# Patient Record
Sex: MALE | Race: BLACK OR AFRICAN AMERICAN | NOT HISPANIC OR LATINO | Employment: UNEMPLOYED | URBAN - METROPOLITAN AREA
[De-identification: names, ages, dates, MRNs, and addresses within clinical notes are randomized per-mention and may not be internally consistent; named-entity substitution may affect disease eponyms.]

---

## 2024-01-26 ENCOUNTER — APPOINTMENT (OUTPATIENT)
Dept: RADIOLOGY | Facility: HOSPITAL | Age: 44
End: 2024-01-26

## 2024-01-26 ENCOUNTER — HOSPITAL ENCOUNTER (EMERGENCY)
Facility: HOSPITAL | Age: 44
Discharge: HOME | End: 2024-01-26

## 2024-01-26 VITALS
RESPIRATION RATE: 18 BRPM | SYSTOLIC BLOOD PRESSURE: 176 MMHG | BODY MASS INDEX: 30.81 KG/M2 | WEIGHT: 253 LBS | OXYGEN SATURATION: 99 % | HEIGHT: 76 IN | HEART RATE: 70 BPM | DIASTOLIC BLOOD PRESSURE: 101 MMHG | TEMPERATURE: 97.2 F

## 2024-01-26 DIAGNOSIS — M54.31 SCIATICA OF RIGHT SIDE: Primary | ICD-10-CM

## 2024-01-26 PROCEDURE — 72131 CT LUMBAR SPINE W/O DYE: CPT

## 2024-01-26 PROCEDURE — 2500000005 HC RX 250 GENERAL PHARMACY W/O HCPCS: Performed by: PHYSICIAN ASSISTANT

## 2024-01-26 PROCEDURE — 99284 EMERGENCY DEPT VISIT MOD MDM: CPT | Mod: 25

## 2024-01-26 PROCEDURE — 72131 CT LUMBAR SPINE W/O DYE: CPT | Performed by: RADIOLOGY

## 2024-01-26 PROCEDURE — 99284 EMERGENCY DEPT VISIT MOD MDM: CPT | Performed by: PHYSICIAN ASSISTANT

## 2024-01-26 PROCEDURE — 2500000001 HC RX 250 WO HCPCS SELF ADMINISTERED DRUGS (ALT 637 FOR MEDICARE OP): Performed by: PHYSICIAN ASSISTANT

## 2024-01-26 RX ORDER — LIDOCAINE 560 MG/1
1 PATCH PERCUTANEOUS; TOPICAL; TRANSDERMAL DAILY
Status: DISCONTINUED | OUTPATIENT
Start: 2024-01-26 | End: 2024-01-26 | Stop reason: HOSPADM

## 2024-01-26 RX ORDER — LIDOCAINE 50 MG/G
1 PATCH TOPICAL DAILY
Qty: 5 PATCH | Refills: 0 | Status: SHIPPED | OUTPATIENT
Start: 2024-01-26

## 2024-01-26 RX ORDER — CYCLOBENZAPRINE HCL 10 MG
10 TABLET ORAL 3 TIMES DAILY PRN
Qty: 21 TABLET | Refills: 0 | Status: SHIPPED | OUTPATIENT
Start: 2024-01-26 | End: 2024-02-02

## 2024-01-26 RX ORDER — ACETAMINOPHEN 325 MG/1
975 TABLET ORAL ONCE
Status: COMPLETED | OUTPATIENT
Start: 2024-01-26 | End: 2024-01-26

## 2024-01-26 RX ORDER — ACETAMINOPHEN 500 MG
1000 TABLET ORAL EVERY 8 HOURS PRN
Qty: 30 TABLET | Refills: 0 | Status: SHIPPED | OUTPATIENT
Start: 2024-01-26 | End: 2024-02-05

## 2024-01-26 RX ORDER — CYCLOBENZAPRINE HCL 10 MG
10 TABLET ORAL ONCE
Status: COMPLETED | OUTPATIENT
Start: 2024-01-26 | End: 2024-01-26

## 2024-01-26 RX ADMIN — ACETAMINOPHEN 975 MG: 325 TABLET ORAL at 10:13

## 2024-01-26 RX ADMIN — LIDOCAINE 1 PATCH: 4 PATCH TOPICAL at 10:13

## 2024-01-26 RX ADMIN — CYCLOBENZAPRINE 10 MG: 10 TABLET, FILM COATED ORAL at 10:12

## 2024-01-26 ASSESSMENT — COLUMBIA-SUICIDE SEVERITY RATING SCALE - C-SSRS
2. HAVE YOU ACTUALLY HAD ANY THOUGHTS OF KILLING YOURSELF?: NO
1. IN THE PAST MONTH, HAVE YOU WISHED YOU WERE DEAD OR WISHED YOU COULD GO TO SLEEP AND NOT WAKE UP?: NO
6. HAVE YOU EVER DONE ANYTHING, STARTED TO DO ANYTHING, OR PREPARED TO DO ANYTHING TO END YOUR LIFE?: NO

## 2024-01-26 ASSESSMENT — PAIN SCALES - GENERAL: PAINLEVEL_OUTOF10: 7

## 2024-01-26 ASSESSMENT — PAIN - FUNCTIONAL ASSESSMENT: PAIN_FUNCTIONAL_ASSESSMENT: 0-10

## 2024-01-26 ASSESSMENT — PAIN DESCRIPTION - LOCATION: LOCATION: HIP

## 2024-01-26 NOTE — Clinical Note
Jed Gonsalez was seen and treated in our emergency department on 1/26/2024.  He may return to work on 01/29/2024.       If you have any questions or concerns, please don't hesitate to call.      Cristal Do PA-C

## 2024-01-26 NOTE — DISCHARGE INSTRUCTIONS
CT L spine shows some mild disc bulging without herniation, spinal fractures.  Take pain medications as needed.  Please call [phone] for Primary Care referral for follow up appointments.  Please call [phone] for Orthopedic / Spine referral for follow up appointments if your pain persists beyond 1 week.

## 2024-01-26 NOTE — ED TRIAGE NOTES
Pt to ED c/o R hip pain radiating down R leg to foot. Pt noting numbness with pain. Denies injury. Pain building for last 3-4 days, worsening today with ambulation

## 2024-01-26 NOTE — ED PROVIDER NOTES
Emergency Department Encounter  [facility] EMERGENCY MEDICINE    Patient: Jed Gonsalez  MRN: [id number]  : 1980  Date of Evaluation: 2024  ED Provider: Cristal Do PA-C      Chief Complaint       Chief Complaint   Patient presents with    Hip Pain     HPI    Jed Gonsalez is a 43 y.o. male who presents to the emergency department presenting for right hip pain for the past 3 days.  Patient states that his pain is constant, fluctuates in intensity.  At rest his pain radiates from his right lateral hip down to his right lateral knee.  With ambulation the pain shoots down to his foot with associated numbness and tingling.  Denies any fevers, chills, bowel or bladder incontinence or retention, saddle anesthesia, inability to ambulate.  Still ambulating independently without need for assistive devices, per his usual baseline.  Not taking any medications at home for his complaints.  Endorses that he had a fall straight onto his back several weeks ago, no other acute injuries.  Endorses history of hives with NSAID ingestion. No focal back/neck pain.    ROS:     Review of Systems  14 systems reviewed and otherwise acutely negative except as in the HPI.    Past History   No past medical history on file.  No past surgical history on file.  Social History     Socioeconomic History    Marital status: Single     Spouse name: Not on file    Number of children: Not on file    Years of education: Not on file    Highest education level: Not on file   Occupational History    Not on file   Tobacco Use    Smoking status: Not on file    Smokeless tobacco: Not on file   Substance and Sexual Activity    Alcohol use: Not on file    Drug use: Not on file    Sexual activity: Not on file   Other Topics Concern    Not on file   Social History Narrative    Not on file     Social Determinants of Health     Financial Resource Strain: Not on file   Food Insecurity: Not on file   Transportation Needs: Not on file    Physical Activity: Not on file   Stress: Not on file   Social Connections: Not on file   Intimate Partner Violence: Not on file   Housing Stability: Not on file       Medications/Allergies     Discharge Medication List as of 1/26/2024 11:09 AM        Allergies   Allergen Reactions    Nsaids (Non-Steroidal Anti-Inflammatory Drug) Hives        Physical Exam       ED Triage Vitals [01/26/24 0909]   Temperature Heart Rate Respirations BP   36.2 °C (97.2 °F) 70 18 (!) 176/101      Pulse Ox Temp Source Heart Rate Source Patient Position   99 % Temporal -- --      BP Location FiO2 (%)     -- --         Physical Exam    Physical Exam:     VS: As documented in the triage note and EMR flowsheet from this visit were reviewed.    Appearance: Alert, oriented, cooperative, in no acute distress. Well nourished & well hydrated.    Skin: Atraumatic. Warm, intact and dry.    Neck: Supple, without midline or paraspinal tenderness    Pulmonary: Clear bilaterally with good chest wall excursion. No rales, rhonchi or wheezing. No accessory muscle use or stridor.     Cardiac: Normal S1, S2.    Abdomen: Soft, nontender, active bowel sounds. No rebound or guarding. No CVA tenderness.    Musculoskeletal: Spontaneously moving all extremities without limitation. No midline tenderness. +R paraspinal TTP with TTP at R sciatic notch, +ipsilateral SLR. Extremities warm and well-perfused, capillary refill less than 2 seconds. Pulses full and equal. No lower extremity erythema, edema or increased warmth.    Neurological: Normal sensation, no weakness, no focal findings identified. Ambulating without assistance with steady gait, non-ataxic.      Diagnostics   Radiographs:  CT lumbar spine wo IV contrast   Final Result   No evidence of an acute fracture of the lumbar spine.        Degenerative changes with mild spinal canal and neural foraminal   stenosis.        MACRO:   None        Signed by: Olya Anguiano 1/26/2024 11:00 AM   Dictation  "workstation:   [id number]          ED Course   Visit Vitals  BP (!) 176/101   Pulse 70   Temp 36.2 °C (97.2 °F) (Temporal)   Resp 18   Ht 1.93 m (6' 4\")   Wt 115 kg (253 lb)   SpO2 99%   BMI 30.80 kg/m²   BSA 2.48 m²     Medications   lidocaine 4 % patch 1 patch (1 patch transdermal Medication Applied 1/26/24 1013)   acetaminophen (Tylenol) tablet 975 mg (975 mg oral Given 1/26/24 1013)   cyclobenzaprine (Flexeril) tablet 10 mg (10 mg oral Given 1/26/24 1012)       Medical Decision Making     Diagnoses as of 01/26/24 1128   Sciatica of right side     CT of the L-spine is negative for acute traumatic injuries.  Does have some disc bulging that the patient was informed of.  Advise follow-up with PCP, orthospine if pain persist.  Given prescriptions for Tylenol, Flexeril and lidocaine patches as needed.  Rest and ice your injuries, avoid heavy lifting until you are seen by PCP.      Final Impression      1. Sciatica of right side          DISPOSITION  Disposition: discharge  Patient condition is: Stable    Comment: Please note this report has been produced using speech recognition software and may contain errors related to that system including errors in grammar, punctuation, and spelling, as well as words and phrases that may be inappropriate.  If there are any questions or concerns please feel free to contact the dictating provider for clarification.    ANG Castellon PA-C  01/26/24 1128    "

## 2025-05-26 ENCOUNTER — HOSPITAL ENCOUNTER (OUTPATIENT)
Facility: HOSPITAL | Age: 45
Setting detail: OBSERVATION
Discharge: HOME | End: 2025-05-27
Attending: EMERGENCY MEDICINE | Admitting: INTERNAL MEDICINE
Payer: COMMERCIAL

## 2025-05-26 ENCOUNTER — APPOINTMENT (OUTPATIENT)
Dept: RADIOLOGY | Facility: HOSPITAL | Age: 45
End: 2025-05-26
Payer: COMMERCIAL

## 2025-05-26 ENCOUNTER — APPOINTMENT (OUTPATIENT)
Dept: CARDIOLOGY | Facility: HOSPITAL | Age: 45
End: 2025-05-26
Payer: COMMERCIAL
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
## 2025-05-26 DIAGNOSIS — R79.89 TROPONIN LEVEL ELEVATED: ICD-10-CM

## 2025-05-26 DIAGNOSIS — T75.4XXA INJURY FROM ELECTROSHOCK GUN, INITIAL ENCOUNTER: Primary | ICD-10-CM

## 2025-05-26 DIAGNOSIS — R79.89 ELEVATED TROPONIN: ICD-10-CM

## 2025-05-26 DIAGNOSIS — I51.7 LVH (LEFT VENTRICULAR HYPERTROPHY): ICD-10-CM

## 2025-05-26 LAB
ALBUMIN SERPL BCP-MCNC: 4.6 G/DL (ref 3.4–5)
ALP SERPL-CCNC: 62 U/L (ref 33–120)
ALT SERPL W P-5'-P-CCNC: 26 U/L (ref 10–52)
ANION GAP SERPL CALC-SCNC: 15 MMOL/L (ref 10–20)
AST SERPL W P-5'-P-CCNC: 30 U/L (ref 9–39)
ATRIAL RATE: 82 BPM
ATRIAL RATE: 82 BPM
ATRIAL RATE: 86 BPM
BASOPHILS # BLD AUTO: 0.02 X10*3/UL (ref 0–0.1)
BASOPHILS NFR BLD AUTO: 0.3 %
BILIRUB SERPL-MCNC: 0.7 MG/DL (ref 0–1.2)
BNP SERPL-MCNC: 6 PG/ML (ref 0–99)
BUN SERPL-MCNC: 18 MG/DL (ref 6–23)
CALCIUM SERPL-MCNC: 9.8 MG/DL (ref 8.6–10.6)
CARDIAC TROPONIN I PNL SERPL HS: 144 NG/L (ref 0–53)
CARDIAC TROPONIN I PNL SERPL HS: 150 NG/L (ref 0–53)
CHLORIDE SERPL-SCNC: 103 MMOL/L (ref 98–107)
CHOLEST SERPL-MCNC: 220 MG/DL (ref 0–199)
CHOLESTEROL/HDL RATIO: 5.5
CO2 SERPL-SCNC: 24 MMOL/L (ref 21–32)
CREAT SERPL-MCNC: 1.46 MG/DL (ref 0.5–1.3)
EGFRCR SERPLBLD CKD-EPI 2021: 61 ML/MIN/1.73M*2
EOSINOPHIL # BLD AUTO: 0.05 X10*3/UL (ref 0–0.7)
EOSINOPHIL NFR BLD AUTO: 0.7 %
ERYTHROCYTE [DISTWIDTH] IN BLOOD BY AUTOMATED COUNT: 12.3 % (ref 11.5–14.5)
EST. AVERAGE GLUCOSE BLD GHB EST-MCNC: 111 MG/DL
GLUCOSE SERPL-MCNC: 107 MG/DL (ref 74–99)
HBA1C MFR BLD: 5.5 % (ref ?–5.7)
HCT VFR BLD AUTO: 40.6 % (ref 41–52)
HDLC SERPL-MCNC: 39.8 MG/DL
HGB BLD-MCNC: 14.3 G/DL (ref 13.5–17.5)
IMM GRANULOCYTES # BLD AUTO: 0.03 X10*3/UL (ref 0–0.7)
IMM GRANULOCYTES NFR BLD AUTO: 0.4 % (ref 0–0.9)
LDLC SERPL CALC-MCNC: 150 MG/DL
LYMPHOCYTES # BLD AUTO: 1.51 X10*3/UL (ref 1.2–4.8)
LYMPHOCYTES NFR BLD AUTO: 21.6 %
MAGNESIUM SERPL-MCNC: 2.27 MG/DL (ref 1.6–2.4)
MCH RBC QN AUTO: 29.9 PG (ref 26–34)
MCHC RBC AUTO-ENTMCNC: 35.2 G/DL (ref 32–36)
MCV RBC AUTO: 85 FL (ref 80–100)
MONOCYTES # BLD AUTO: 0.39 X10*3/UL (ref 0.1–1)
MONOCYTES NFR BLD AUTO: 5.6 %
NEUTROPHILS # BLD AUTO: 4.98 X10*3/UL (ref 1.2–7.7)
NEUTROPHILS NFR BLD AUTO: 71.4 %
NON HDL CHOLESTEROL: 180 MG/DL (ref 0–149)
NRBC BLD-RTO: 0 /100 WBCS (ref 0–0)
P AXIS: 26 DEGREES
P AXIS: 45 DEGREES
P AXIS: 47 DEGREES
P OFFSET: 174 MS
P OFFSET: 178 MS
P OFFSET: 179 MS
P ONSET: 128 MS
P ONSET: 130 MS
P ONSET: 132 MS
PLATELET # BLD AUTO: 308 X10*3/UL (ref 150–450)
POTASSIUM SERPL-SCNC: 3.8 MMOL/L (ref 3.5–5.3)
PR INTERVAL: 172 MS
PR INTERVAL: 178 MS
PR INTERVAL: 178 MS
PROT SERPL-MCNC: 7.9 G/DL (ref 6.4–8.2)
Q ONSET: 217 MS
Q ONSET: 218 MS
Q ONSET: 219 MS
QRS COUNT: 13 BEATS
QRS COUNT: 13 BEATS
QRS COUNT: 14 BEATS
QRS DURATION: 116 MS
QRS DURATION: 118 MS
QRS DURATION: 118 MS
QT INTERVAL: 364 MS
QT INTERVAL: 366 MS
QT INTERVAL: 370 MS
QTC CALCULATION(BAZETT): 425 MS
QTC CALCULATION(BAZETT): 432 MS
QTC CALCULATION(BAZETT): 437 MS
QTC FREDERICIA: 404 MS
QTC FREDERICIA: 410 MS
QTC FREDERICIA: 412 MS
R AXIS: 39 DEGREES
R AXIS: 47 DEGREES
R AXIS: 60 DEGREES
RBC # BLD AUTO: 4.78 X10*6/UL (ref 4.5–5.9)
SODIUM SERPL-SCNC: 138 MMOL/L (ref 136–145)
T AXIS: -26 DEGREES
T AXIS: -31 DEGREES
T AXIS: -34 DEGREES
T OFFSET: 400 MS
T OFFSET: 400 MS
T OFFSET: 404 MS
TRIGL SERPL-MCNC: 149 MG/DL (ref 0–149)
VENTRICULAR RATE: 82 BPM
VENTRICULAR RATE: 82 BPM
VENTRICULAR RATE: 86 BPM
VLDL: 30 MG/DL (ref 0–40)
WBC # BLD AUTO: 7 X10*3/UL (ref 4.4–11.3)

## 2025-05-26 PROCEDURE — 93010 ELECTROCARDIOGRAM REPORT: CPT | Performed by: INTERNAL MEDICINE

## 2025-05-26 PROCEDURE — 85025 COMPLETE CBC W/AUTO DIFF WBC: CPT

## 2025-05-26 PROCEDURE — 2500000001 HC RX 250 WO HCPCS SELF ADMINISTERED DRUGS (ALT 637 FOR MEDICARE OP): Mod: SE

## 2025-05-26 PROCEDURE — 99222 1ST HOSP IP/OBS MODERATE 55: CPT

## 2025-05-26 PROCEDURE — 36415 COLL VENOUS BLD VENIPUNCTURE: CPT

## 2025-05-26 PROCEDURE — 93005 ELECTROCARDIOGRAM TRACING: CPT

## 2025-05-26 PROCEDURE — 84484 ASSAY OF TROPONIN QUANT: CPT

## 2025-05-26 PROCEDURE — 99285 EMERGENCY DEPT VISIT HI MDM: CPT | Mod: 25 | Performed by: EMERGENCY MEDICINE

## 2025-05-26 PROCEDURE — 93321 DOPPLER ECHO F-UP/LMTD STD: CPT

## 2025-05-26 PROCEDURE — 83880 ASSAY OF NATRIURETIC PEPTIDE: CPT | Performed by: STUDENT IN AN ORGANIZED HEALTH CARE EDUCATION/TRAINING PROGRAM

## 2025-05-26 PROCEDURE — 93308 TTE F-UP OR LMTD: CPT | Performed by: INTERNAL MEDICINE

## 2025-05-26 PROCEDURE — 93325 DOPPLER ECHO COLOR FLOW MAPG: CPT | Performed by: INTERNAL MEDICINE

## 2025-05-26 PROCEDURE — G0378 HOSPITAL OBSERVATION PER HR: HCPCS

## 2025-05-26 PROCEDURE — 80061 LIPID PANEL: CPT | Performed by: STUDENT IN AN ORGANIZED HEALTH CARE EDUCATION/TRAINING PROGRAM

## 2025-05-26 PROCEDURE — 2500000004 HC RX 250 GENERAL PHARMACY W/ HCPCS (ALT 636 FOR OP/ED): Mod: SE

## 2025-05-26 PROCEDURE — 96374 THER/PROPH/DIAG INJ IV PUSH: CPT | Mod: 59

## 2025-05-26 PROCEDURE — 83735 ASSAY OF MAGNESIUM: CPT

## 2025-05-26 PROCEDURE — 71046 X-RAY EXAM CHEST 2 VIEWS: CPT

## 2025-05-26 PROCEDURE — 93321 DOPPLER ECHO F-UP/LMTD STD: CPT | Performed by: INTERNAL MEDICINE

## 2025-05-26 PROCEDURE — 83036 HEMOGLOBIN GLYCOSYLATED A1C: CPT | Performed by: STUDENT IN AN ORGANIZED HEALTH CARE EDUCATION/TRAINING PROGRAM

## 2025-05-26 PROCEDURE — 71046 X-RAY EXAM CHEST 2 VIEWS: CPT | Performed by: STUDENT IN AN ORGANIZED HEALTH CARE EDUCATION/TRAINING PROGRAM

## 2025-05-26 PROCEDURE — 80053 COMPREHEN METABOLIC PANEL: CPT

## 2025-05-26 RX ORDER — METOPROLOL TARTRATE 1 MG/ML
5 INJECTION, SOLUTION INTRAVENOUS ONCE AS NEEDED
Status: DISCONTINUED | OUTPATIENT
Start: 2025-05-26 | End: 2025-05-26

## 2025-05-26 RX ORDER — LISINOPRIL 10 MG/1
10 TABLET ORAL
COMMUNITY
Start: 2025-04-12 | End: 2025-05-27 | Stop reason: HOSPADM

## 2025-05-26 RX ORDER — ENOXAPARIN SODIUM 100 MG/ML
40 INJECTION SUBCUTANEOUS DAILY
Status: DISCONTINUED | OUTPATIENT
Start: 2025-05-26 | End: 2025-05-27 | Stop reason: HOSPADM

## 2025-05-26 RX ORDER — DIPHENHYDRAMINE HYDROCHLORIDE 50 MG/ML
25 INJECTION, SOLUTION INTRAMUSCULAR; INTRAVENOUS ONCE
Status: COMPLETED | OUTPATIENT
Start: 2025-05-26 | End: 2025-05-26

## 2025-05-26 RX ORDER — HYDROCHLOROTHIAZIDE 25 MG/1
25 TABLET ORAL
COMMUNITY
Start: 2025-04-12 | End: 2025-05-27 | Stop reason: HOSPADM

## 2025-05-26 RX ORDER — AMLODIPINE BESYLATE 5 MG/1
5 TABLET ORAL
Status: ON HOLD | COMMUNITY
Start: 2025-04-12 | End: 2025-05-27

## 2025-05-26 RX ORDER — AMLODIPINE BESYLATE 5 MG/1
5 TABLET ORAL DAILY
Status: DISCONTINUED | OUTPATIENT
Start: 2025-05-26 | End: 2025-05-26

## 2025-05-26 RX ORDER — METOPROLOL TARTRATE 1 MG/ML
5 INJECTION, SOLUTION INTRAVENOUS ONCE
Status: DISCONTINUED | OUTPATIENT
Start: 2025-05-26 | End: 2025-05-26

## 2025-05-26 RX ORDER — METOPROLOL TARTRATE 50 MG/1
100 TABLET ORAL ONCE
Status: DISCONTINUED | OUTPATIENT
Start: 2025-05-26 | End: 2025-05-26

## 2025-05-26 RX ORDER — VALSARTAN 40 MG/1
40 TABLET ORAL DAILY
Status: DISCONTINUED | OUTPATIENT
Start: 2025-05-26 | End: 2025-05-27 | Stop reason: HOSPADM

## 2025-05-26 RX ORDER — AMLODIPINE BESYLATE 5 MG/1
5 TABLET ORAL
Status: DISCONTINUED | OUTPATIENT
Start: 2025-05-27 | End: 2025-05-27 | Stop reason: HOSPADM

## 2025-05-26 RX ORDER — METOPROLOL TARTRATE 50 MG/1
100 TABLET ORAL ONCE AS NEEDED
Status: DISCONTINUED | OUTPATIENT
Start: 2025-05-26 | End: 2025-05-26

## 2025-05-26 RX ORDER — POLYETHYLENE GLYCOL 3350 17 G/17G
17 POWDER, FOR SOLUTION ORAL DAILY PRN
Status: DISCONTINUED | OUTPATIENT
Start: 2025-05-26 | End: 2025-05-27 | Stop reason: HOSPADM

## 2025-05-26 RX ORDER — NAPROXEN SODIUM 220 MG/1
324 TABLET, FILM COATED ORAL ONCE
Status: COMPLETED | OUTPATIENT
Start: 2025-05-26 | End: 2025-05-26

## 2025-05-26 RX ORDER — LORAZEPAM 2 MG/ML
0.5 INJECTION INTRAMUSCULAR EVERY 5 MIN PRN
Status: DISCONTINUED | OUTPATIENT
Start: 2025-05-26 | End: 2025-05-27 | Stop reason: HOSPADM

## 2025-05-26 RX ORDER — NITROGLYCERIN 0.4 MG/1
0.8 TABLET SUBLINGUAL ONCE
Status: DISCONTINUED | OUTPATIENT
Start: 2025-05-26 | End: 2025-05-27 | Stop reason: HOSPADM

## 2025-05-26 RX ADMIN — VALSARTAN 40 MG: 40 TABLET, FILM COATED ORAL at 10:46

## 2025-05-26 RX ADMIN — DIPHENHYDRAMINE HYDROCHLORIDE 25 MG: 50 INJECTION INTRAMUSCULAR; INTRAVENOUS at 09:06

## 2025-05-26 RX ADMIN — ASPIRIN 324 MG: 81 TABLET, CHEWABLE ORAL at 07:19

## 2025-05-26 RX ADMIN — AMLODIPINE BESYLATE 5 MG: 5 TABLET ORAL at 10:46

## 2025-05-26 SDOH — SOCIAL STABILITY: SOCIAL INSECURITY
WITHIN THE LAST YEAR, HAVE YOU BEEN KICKED, HIT, SLAPPED, OR OTHERWISE PHYSICALLY HURT BY YOUR PARTNER OR EX-PARTNER?: NO

## 2025-05-26 SDOH — SOCIAL STABILITY: SOCIAL INSECURITY: WITHIN THE LAST YEAR, HAVE YOU BEEN AFRAID OF YOUR PARTNER OR EX-PARTNER?: NO

## 2025-05-26 SDOH — SOCIAL STABILITY: SOCIAL INSECURITY: WITHIN THE LAST YEAR, HAVE YOU BEEN HUMILIATED OR EMOTIONALLY ABUSED IN OTHER WAYS BY YOUR PARTNER OR EX-PARTNER?: NO

## 2025-05-26 SDOH — ECONOMIC STABILITY: FOOD INSECURITY: WITHIN THE PAST 12 MONTHS, THE FOOD YOU BOUGHT JUST DIDN'T LAST AND YOU DIDN'T HAVE MONEY TO GET MORE.: NEVER TRUE

## 2025-05-26 SDOH — SOCIAL STABILITY: SOCIAL INSECURITY
WITHIN THE LAST YEAR, HAVE YOU BEEN RAPED OR FORCED TO HAVE ANY KIND OF SEXUAL ACTIVITY BY YOUR PARTNER OR EX-PARTNER?: NO

## 2025-05-26 SDOH — ECONOMIC STABILITY: INCOME INSECURITY: IN THE PAST 12 MONTHS HAS THE ELECTRIC, GAS, OIL, OR WATER COMPANY THREATENED TO SHUT OFF SERVICES IN YOUR HOME?: NO

## 2025-05-26 SDOH — SOCIAL STABILITY: SOCIAL INSECURITY: HAVE YOU HAD THOUGHTS OF HARMING ANYONE ELSE?: NO

## 2025-05-26 SDOH — ECONOMIC STABILITY: FOOD INSECURITY: WITHIN THE PAST 12 MONTHS, YOU WORRIED THAT YOUR FOOD WOULD RUN OUT BEFORE YOU GOT THE MONEY TO BUY MORE.: NEVER TRUE

## 2025-05-26 SDOH — SOCIAL STABILITY: SOCIAL INSECURITY: WERE YOU ABLE TO COMPLETE ALL THE BEHAVIORAL HEALTH SCREENINGS?: YES

## 2025-05-26 ASSESSMENT — COGNITIVE AND FUNCTIONAL STATUS - GENERAL
PATIENT BASELINE BEDBOUND: NO
DAILY ACTIVITIY SCORE: 24
DAILY ACTIVITIY SCORE: 24
MOBILITY SCORE: 24
DAILY ACTIVITIY SCORE: 24
MOBILITY SCORE: 24
MOBILITY SCORE: 24

## 2025-05-26 ASSESSMENT — COLUMBIA-SUICIDE SEVERITY RATING SCALE - C-SSRS
2. HAVE YOU ACTUALLY HAD ANY THOUGHTS OF KILLING YOURSELF?: NO
6. HAVE YOU EVER DONE ANYTHING, STARTED TO DO ANYTHING, OR PREPARED TO DO ANYTHING TO END YOUR LIFE?: NO
1. IN THE PAST MONTH, HAVE YOU WISHED YOU WERE DEAD OR WISHED YOU COULD GO TO SLEEP AND NOT WAKE UP?: NO

## 2025-05-26 ASSESSMENT — LIFESTYLE VARIABLES
HOW OFTEN DO YOU HAVE 6 OR MORE DRINKS ON ONE OCCASION: MONTHLY
HOW OFTEN DO YOU HAVE A DRINK CONTAINING ALCOHOL: 2-4 TIMES A MONTH
SKIP TO QUESTIONS 9-10: 0
HOW MANY STANDARD DRINKS CONTAINING ALCOHOL DO YOU HAVE ON A TYPICAL DAY: 3 OR 4
AUDIT-C TOTAL SCORE: 5
AUDIT-C TOTAL SCORE: 5

## 2025-05-26 ASSESSMENT — ACTIVITIES OF DAILY LIVING (ADL)
FEEDING YOURSELF: INDEPENDENT
PATIENT'S MEMORY ADEQUATE TO SAFELY COMPLETE DAILY ACTIVITIES?: YES
HEARING - RIGHT EAR: FUNCTIONAL
HEARING - LEFT EAR: FUNCTIONAL
GROOMING: INDEPENDENT
LACK_OF_TRANSPORTATION: NO
JUDGMENT_ADEQUATE_SAFELY_COMPLETE_DAILY_ACTIVITIES: YES
DRESSING YOURSELF: INDEPENDENT
WALKS IN HOME: INDEPENDENT
TOILETING: INDEPENDENT
BATHING: INDEPENDENT
ADEQUATE_TO_COMPLETE_ADL: YES

## 2025-05-26 ASSESSMENT — PATIENT HEALTH QUESTIONNAIRE - PHQ9
1. LITTLE INTEREST OR PLEASURE IN DOING THINGS: NOT AT ALL
SUM OF ALL RESPONSES TO PHQ9 QUESTIONS 1 & 2: 0
2. FEELING DOWN, DEPRESSED OR HOPELESS: NOT AT ALL

## 2025-05-26 ASSESSMENT — PAIN SCALES - GENERAL
PAINLEVEL_OUTOF10: 0 - NO PAIN

## 2025-05-26 ASSESSMENT — PAIN - FUNCTIONAL ASSESSMENT
PAIN_FUNCTIONAL_ASSESSMENT: 0-10

## 2025-05-26 ASSESSMENT — PAIN DESCRIPTION - PROGRESSION: CLINICAL_PROGRESSION: GRADUALLY IMPROVING
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
## 2025-05-26 NOTE — DISCHARGE INSTRUCTIONS
You came to the hospital for medical clearance after you were tased. There were some findings in your lab work and EKG that prompted further workup, however these tests were not indicative of anything serious. Your heart MRI showed that the muscle in your heart is thicker than usual. This is probably because of your high blood pressure but you should follow up with a cardiologist, for which you have been referred. You should take the blood pressure medications prescribed to prevent any future heart injury. We're glad you are feeling well enough to go home.    Please review all your medication changes. If you have any questions, please let us know before you leave, or reach out to your primary care provider (PCP). Your on-file PCP is None.    Medications Started: Amlodipine and Valsartan    Appointments/follow-up:  Primary Care appointment  Cardiology Appointment    The central scheduling line is [phone] if you do not hear from one of these services or if you need to reschedule.    It was a pleasure to take care of you; we hope you continue to feel better!

## 2025-05-26 NOTE — CARE PLAN
The patient's goals for the shift include  maintain hds, normal sinus rhythm on monitor.    The clinical goals for the shift include be admitted      Problem: Pain - Adult  Goal: Verbalizes/displays adequate comfort level or baseline comfort level  Outcome: Progressing     Problem: Safety - Adult  Goal: Free from fall injury  Outcome: Progressing     Problem: Chronic Conditions and Co-morbidities  Goal: Patient's chronic conditions and co-morbidity symptoms are monitored and maintained or improved  Outcome: Progressing     Problem: ACS/CP/NSTEMI/STEMI  Goal: Promote self management  Outcome: Progressing     Problem: ACS/CP/NSTEMI/STEMI  Goal: Serial ECG will return to baseline  Outcome: Progressing     Problem: ACS/CP/NSTEMI/STEMI  Goal: Wean vasopressors/achieve hemodynamic stability  Outcome: Progressing

## 2025-05-26 NOTE — HOSPITAL COURSE
Jed Gonsalez is a 45 y.o. male with PMH ANDRESSA, Stage 2 CKD, and HTN who presented for medical clearance after being tasered by police, admitted for ischemia rule out.      Patient was reportedly tasered earlier today by police. Was brought to the emergency department for medical clearance. /117, HR 94, on RA. asymptomatic throughout stay without any chest pain, sob, or diaphoresis. ECG initially with concern for ST segment elevations in leads I, aVL, V2 through V4 and ST depressions in the lateral leads. However this was very likely caused by early polarization. cardiology team was consulted, no concern for ACS and no STEMI activation. Troponin 150->144. Admitted to cardiology team for further avila of CM.  Given ASA in ED for which patient had itching and hives resolved with Benadryl. Started amlodipine 5 Mg and valsartan 40 Mg daily.    TTE 5/27 pending. :: ASCVD Moderate intensity statin recommended (, HDL 40, trig 149, A1C 5.5%), discussed with patient but deferred to outpatient. Cardiac MR 5/27: LVEF 48%, no WMA, diffuse LV hypertrophy, more in the septum, no signs of ischemia. Discharged to home with cardiology follow up, pt. Without any acute complaints and feeling well.

## 2025-05-26 NOTE — SIGNIFICANT EVENT
"45-year-old male with PMH of uncontrolled HTN and not on any medications. The pt was tasered by the police and then brought to our ED for check-up and medical clearance.  I was called due to concern for ST elevations on EKG. Findings concerning for ST elevations in lateral leads with ST depression and T-wave inversions in anterior leads. Pt is alert and oriented x4 without any complaints. He denies any chest pain, SOB, diaphoresis, dizziness, Nausea, vomiting, or abd pain. He says he never usually has any symptoms. He admits to being diagnosed with \" Athlete's heart\" and has uncontrolled hypertension for which he does not take meds for and does not see a doctor. Bedside TTE done by me revealed preserved EF without WMA. Mild AI noted. LVH also noted on TTE. Given that the pt is asymptomatic a this time without any concern for ACS/Plaque rupture, there is no indication to activate the cath lab. Troponins still pending. Kindly check troponins x3 if normal or until peak if elevated. Also order BNP and routine labs, including LFTs and Urine Tox. Would recommend admission to a primary cardiology service for tele monitoring and further care. Will also need formal TTE and ischemic evaluation (to be decided by primary cardiology team). Kindly call back if pt devlops chest pain or any concern for ACS. The case was discussed with the intervetional oncall attending, Dr. De   "
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
## 2025-05-27 ENCOUNTER — CLINICAL SUPPORT (OUTPATIENT)
Dept: EMERGENCY MEDICINE | Facility: HOSPITAL | Age: 45
End: 2025-05-27
Payer: COMMERCIAL

## 2025-05-27 ENCOUNTER — PHARMACY VISIT (OUTPATIENT)
Dept: PHARMACY | Facility: CLINIC | Age: 45
End: 2025-05-27
Payer: MEDICAID

## 2025-05-27 ENCOUNTER — APPOINTMENT (OUTPATIENT)
Dept: RADIOLOGY | Facility: HOSPITAL | Age: 45
End: 2025-05-27
Payer: COMMERCIAL

## 2025-05-27 ENCOUNTER — APPOINTMENT (OUTPATIENT)
Dept: CARDIOLOGY | Facility: HOSPITAL | Age: 45
End: 2025-05-27
Payer: COMMERCIAL

## 2025-05-27 VITALS
BODY MASS INDEX: 30.04 KG/M2 | HEART RATE: 75 BPM | OXYGEN SATURATION: 99 % | TEMPERATURE: 97 F | DIASTOLIC BLOOD PRESSURE: 92 MMHG | RESPIRATION RATE: 18 BRPM | WEIGHT: 246.69 LBS | HEIGHT: 76 IN | SYSTOLIC BLOOD PRESSURE: 153 MMHG

## 2025-05-27 PROBLEM — R79.89 TROPONIN LEVEL ELEVATED: Status: RESOLVED | Noted: 2025-05-26 | Resolved: 2025-05-27

## 2025-05-27 LAB
AMPHETAMINES UR QL SCN: ABNORMAL
AORTIC VALVE MEAN GRADIENT: 4 MMHG
AORTIC VALVE MEAN GRADIENT: 6 MMHG
AORTIC VALVE PEAK VELOCITY: 1.58 M/S
AORTIC VALVE PEAK VELOCITY: 1.73 M/S
AV PEAK GRADIENT: 10 MMHG
AV PEAK GRADIENT: 12 MMHG
AVA (PEAK VEL): 2.19 CM2
AVA (VTI): 2.43 CM2
BARBITURATES UR QL SCN: ABNORMAL
BENZODIAZ UR QL SCN: ABNORMAL
BODY SURFACE AREA: 2.45 M2
BZE UR QL SCN: ABNORMAL
CANNABINOIDS UR QL SCN: ABNORMAL
EJECTION FRACTION APICAL 4 CHAMBER: 60.9
EJECTION FRACTION: 55 %
EJECTION FRACTION: 58 %
FENTANYL+NORFENTANYL UR QL SCN: ABNORMAL
LEFT ATRIUM VOLUME AREA LENGTH INDEX BSA: 15.6 ML/M2
LEFT VENTRICLE INTERNAL DIMENSION DIASTOLE: 5 CM (ref 3.5–6)
LEFT VENTRICULAR OUTFLOW TRACT DIAMETER: 1.96 CM
METHADONE UR QL SCN: ABNORMAL
MITRAL VALVE E/A RATIO: 1.3
OPIATES UR QL SCN: ABNORMAL
OXYCODONE+OXYMORPHONE UR QL SCN: ABNORMAL
PCP UR QL SCN: ABNORMAL
RIGHT VENTRICLE FREE WALL PEAK S': 13 CM/S
RIGHT VENTRICLE PEAK SYSTOLIC PRESSURE: 27.9 MMHG
TRICUSPID ANNULAR PLANE SYSTOLIC EXCURSION: 3 CM

## 2025-05-27 PROCEDURE — 2500000001 HC RX 250 WO HCPCS SELF ADMINISTERED DRUGS (ALT 637 FOR MEDICARE OP): Mod: SE

## 2025-05-27 PROCEDURE — A9575 INJ GADOTERATE MEGLUMI 0.1ML: HCPCS | Mod: JZ,SE

## 2025-05-27 PROCEDURE — 93306 TTE W/DOPPLER COMPLETE: CPT

## 2025-05-27 PROCEDURE — RXMED WILLOW AMBULATORY MEDICATION CHARGE

## 2025-05-27 PROCEDURE — 75563 CARD MRI W/STRESS IMG & DYE: CPT

## 2025-05-27 PROCEDURE — 99239 HOSP IP/OBS DSCHRG MGMT >30: CPT | Performed by: INTERNAL MEDICINE

## 2025-05-27 PROCEDURE — 80307 DRUG TEST PRSMV CHEM ANLYZR: CPT | Performed by: STUDENT IN AN ORGANIZED HEALTH CARE EDUCATION/TRAINING PROGRAM

## 2025-05-27 PROCEDURE — 75563 CARD MRI W/STRESS IMG & DYE: CPT | Performed by: RADIOLOGY

## 2025-05-27 PROCEDURE — G0378 HOSPITAL OBSERVATION PER HR: HCPCS

## 2025-05-27 PROCEDURE — 2550000001 HC RX 255 CONTRASTS: Mod: JZ,SE

## 2025-05-27 PROCEDURE — 93306 TTE W/DOPPLER COMPLETE: CPT | Performed by: INTERNAL MEDICINE

## 2025-05-27 RX ORDER — VALSARTAN 40 MG/1
40 TABLET ORAL DAILY
Qty: 30 TABLET | Refills: 1 | Status: SHIPPED | OUTPATIENT
Start: 2025-05-28 | End: 2025-07-27

## 2025-05-27 RX ORDER — AMLODIPINE BESYLATE 5 MG/1
5 TABLET ORAL
Qty: 30 TABLET | Refills: 1 | Status: SHIPPED | OUTPATIENT
Start: 2025-05-27 | End: 2025-07-26

## 2025-05-27 RX ORDER — GADOTERATE MEGLUMINE 376.9 MG/ML
40 INJECTION INTRAVENOUS
Status: COMPLETED | OUTPATIENT
Start: 2025-05-27 | End: 2025-05-27

## 2025-05-27 RX ADMIN — GADOTERATE MEGLUMINE 40 ML: 376.9 INJECTION INTRAVENOUS at 12:57

## 2025-05-27 RX ADMIN — AMLODIPINE BESYLATE 5 MG: 5 TABLET ORAL at 06:04

## 2025-05-27 RX ADMIN — VALSARTAN 40 MG: 40 TABLET, FILM COATED ORAL at 08:25

## 2025-05-27 ASSESSMENT — COGNITIVE AND FUNCTIONAL STATUS - GENERAL
MOBILITY SCORE: 24
DAILY ACTIVITIY SCORE: 24

## 2025-05-27 ASSESSMENT — PAIN - FUNCTIONAL ASSESSMENT: PAIN_FUNCTIONAL_ASSESSMENT: 0-10

## 2025-05-27 ASSESSMENT — PAIN SCALES - GENERAL: PAINLEVEL_OUTOF10: 0 - NO PAIN

## 2025-05-27 NOTE — CARE PLAN
Patient had cardiac mri, stress mri, ECHO completed for clearance    The clinical goals for the shift include Patient will remain stable, no c/o cp, headache, sob.          Problem: Pain - Adult  Goal: Verbalizes/displays adequate comfort level or baseline comfort level  Outcome: Met     Problem: Safety - Adult  Goal: Free from fall injury  Outcome: Met     Problem: Discharge Planning  Goal: Discharge to home or other facility with appropriate resources  Outcome: Met     Problem: Chronic Conditions and Co-morbidities  Goal: Patient's chronic conditions and co-morbidity symptoms are monitored and maintained or improved  Outcome: Met     Problem: Nutrition  Goal: Nutrient intake appropriate for maintaining nutritional needs  Outcome: Met     Problem: ACS/CP/NSTEMI/STEMI  Goal: Chest pain managed (free from pain or at acceptable level)  Outcome: Met  Goal: Lab values return to normal range  Outcome: Met  Goal: Promote self management  Outcome: Met  Goal: Serial ECG will return to baseline  Outcome: Met  Goal: Verbalize understanding of procedures/devices  Outcome: Met  Goal: Wean vasopressors/achieve hemodynamic stability  Outcome: Met

## 2025-05-27 NOTE — PROGRESS NOTES
Internal Medicine Daily Progress Note    Subjective     Interval events:  Bradycardic overnight with short 2-3second pause on tele. Asymptomatic.No acute complaints this am.       Objective     Vitals:  Visit Vitals  BP (!) 165/109 (BP Location: Right arm, Patient Position: Lying)   Pulse 75   Temp 36.7 °C (98.1 °F) (Temporal)   Resp 18       No intake or output data in the 24 hours ending 05/27/25 0702    Physical exam:  Constitutional: Well-developed male in no acute distress.  Muscular build.  HEENT: Normocephalic, atraumatic. PERRL. EOMI.  Respiratory: CTA bilaterally. No wheezes, rales, or rhonchi. Normal respiratory effort.  Cardiovascular: RRR. No murmurs, gallops, or rubs. No JVD.  Abdominal: Soft, nondistended, nontender to palpation.  Neuro: CN II-XII intact. UE and LE strength 5/5 bilaterally and sensation intact.  Skin: Warm, dry. No rashes or wounds.    Medications:  Scheduled Medications[1]  Continuous Medications[2]  PRN Medications[3]    Labs:  Results from last 72 hours   Lab Units 05/26/25  0554   WBC AUTO x10*3/uL 7.0   HEMOGLOBIN g/dL 14.3   HEMATOCRIT % 40.6*   PLATELETS AUTO x10*3/uL 308   SODIUM mmol/L 138   POTASSIUM mmol/L 3.8   CHLORIDE mmol/L 103   CO2 mmol/L 24   BUN mg/dL 18   CREATININE mg/dL 1.46*   GLUCOSE mg/dL 107*   CALCIUM mg/dL 9.8   MAGNESIUM mg/dL 2.27   ALBUMIN g/dL 4.6   ALK PHOS U/L 62   ALT U/L 26   AST U/L 30   BILIRUBIN TOTAL mg/dL 0.7         Imaging:  Imaging  XR chest 2 views  Result Date: 5/26/2025  1. No acute cardiopulmonary process.   MACRO: None   Signed by: Dilan Fuchs 5/26/2025 5:11 AM Dictation workstation:   [id number]      Cardiology, Vascular, and Other Imaging  ECG 12 lead  Result Date: 5/26/2025  Normal sinus rhythm Left ventricular hypertrophy with QRS widening and repolarization abnormality ( Sokolow-Montoya , [place] product , Romhilt-Gann ) ST elevation, consider anterolateral injury or acute infarct ** ** ACUTE MI / STEMI ** ** Abnormal ECG When  compared with ECG of 26-MAY-2025 04:21, No significant change was found See ED provider note for full interpretation and clinical correlation Confirmed by Yolanda Delatorre ([id number]) on 5/26/2025 5:21:01 AM    ECG 12 lead  Result Date: 5/26/2025  Normal sinus rhythm Left ventricular hypertrophy with QRS widening and repolarization abnormality ( Sokolow-Montoya , Francisco product ) ST elevation, consider anterolateral injury or acute infarct ** ** ACUTE MI / STEMI ** ** Abnormal ECG When compared with ECG of 15-AUG-2012 15:22, Significant changes have occurred See ED provider note for full interpretation and clinical correlation Confirmed by Yolanda Delatorre ([id number]) on 5/26/2025 5:20:51 AM    ECG 12 lead  Result Date: 5/26/2025  Normal sinus rhythm Left ventricular hypertrophy with QRS widening and repolarization abnormality ( Sokolow-Montoya , [place] product , Romhilt-Gann ) ST elevation, consider lateral injury or acute infarct ** ** ACUTE MI / STEMI ** ** Abnormal ECG When compared with ECG of 26-MAY-2025 04:19, No significant change was found See ED provider note for full interpretation and clinical correlation Confirmed by Yolanda Delatorre ([id number]) on 5/26/2025 5:20:39 AM       Assessment and Plan:  Jed Gonsalez is a 45 y.o. male with PMH ANDRESSA, Stage 2 CKD, and HTN who presented for medical evaluation after being tasered, admitted after ECG concerning for ST elevations. Denies any chest pain, diaphoresis, or any other symptoms. Troponin 150-> 144. Bedside TTE by cardiology fellow with preserved EF, no noted WMA, and LVH. Per literature review troponin elevation is not expected with high voltage devices such as tasers. Nonetheless, no concern for ACS currently. Some c/f HCM on TTE, more likely CM iso uncontrolled hypertension. Pending stress CMR, formal TTE.     Updates 05/27/25:  - Dispo pending stress CMR and TTE    # Elevated Troponin  # Non Ischemic CM  # HTN  :: No concern for ACS currently. Not c/f HCM, however likely iso  uncontrolled hypertension  :: No chest pain, Trop 150 ->144, ST elevations in lateral leads with ST depression and T-wave inversions in anterior leads more pronounced than prior, signs of LVH.  :: Bedside TTE by cardiology fellow with preserved EF, no noted WMA, and LVH.   :: ASCVD 6-9%, Moderate intensity statin recommended (, HDL 40, trig 149, A1C 5.5%)  Plan:  - Urine tox + for amphetamines  - Tele   - Shared decision making, will discuss statin with patient vs. Lifestyle changes.  - Stress CMR and Formal TTE ordered  - amlodipine 5 Mg and valsartan 40mg daily     # CKD  :: Cr 1.46 on admit, has followed with nephrology in the past.  - Will ctm     Diet: Regular  DVT prophylaxis: None  Code status: Full Code  NOK: Zee Gonsalez (Mother)  [phone]     Patient and plan discussed with attending physician.    Rob (Joseph) MD Carlos Manuel  IM PGY-1          [1] amLODIPine, 5 mg, oral, Daily  enoxaparin, 40 mg, subcutaneous, Daily  nitroglycerin, 0.8 mg, sublingual, Once  perflutren lipid microspheres, 0.5-10 mL of dilution, intravenous, Once in imaging  perflutren protein A microsphere, 0.5 mL, intravenous, Once in imaging  sulfur hexafluoride microsphr, 2 mL, intravenous, Once in imaging  valsartan, 40 mg, oral, Daily  [2]    [3] PRN medications: LORazepam, polyethylene glycol

## 2025-05-27 NOTE — NURSING NOTE
MRI STRESS        Stress protocol: Regadenoson  Regadenoson Dose: 0.4mg  Aminophylline Dose: 100mg     Resting Vitals:  BP: 166/91  HR: 58  SPO2: 98%  Resting ECG: NSR     Stress:  0.4mg IV push Regadenoson:  1 min: /75, HR 81, RR 20, 98%  Symptoms: SOB  2 min: /80 HR 79,  RR 18, 98% Symptoms: None     Reversal/Recovery:  100mg IV push Aminophylline:  1 min: /87, HR 70, RR 18, 95%  Symptoms: None  2 min: /84, HR 64, RR 20, 96%  Symptoms: None  4 min: /88, HR 64, RR 16, 96% Symptoms: None  6 min: /85, HR 64, RR 18, 95% Symptoms: None     Patients resting HR of 58 bpm juliette to a maximum of 81 bpm.  Resting BP of 166/91 was the highest of the exam. Patients post stress EKG remained unchanged.  Patient transferred back to floor without incident.

## 2025-05-27 NOTE — DISCHARGE SUMMARY
Discharge Diagnosis  Troponin level elevated     Issues Requiring Follow-Up  - Cardiology follow-up for LVH (on MRI), TTE read pending  - PCP follow up for HTN, ASCVD risk intermediate -> discuss Statin    Discharge Meds     Medication List      START taking these medications     valsartan 40 mg tablet; Commonly known as: Diovan; Take 1 tablet (40 mg)   by mouth once daily.; Start taking on: May 28, 2025     CONTINUE taking these medications     amLODIPine 5 mg tablet; Commonly known as: Norvasc; Take 1 tablet (5 mg)   by mouth once daily.     STOP taking these medications     hydroCHLOROthiazide 25 mg tablet; Commonly known as: HYDRODiuril   lisinopril 10 mg tablet       Test Results Pending At Discharge  Pending Labs       No current pending labs.            Hospital Course  Jed Gonsalez is a 45 y.o. male with PMH ANDRESSA, Stage 2 CKD, and HTN who presented for medical clearance after being tasered by police, admitted for ischemia rule out.      Patient was reportedly tasered earlier today by police. Was brought to the emergency department for medical clearance. /117, HR 94, on RA. asymptomatic throughout stay without any chest pain, sob, or diaphoresis. ECG initially with concern for ST segment elevations in leads I, aVL, V2 through V4 and ST depressions in the lateral leads. However this was very likely caused by early polarization. cardiology team was consulted, no concern for ACS and no STEMI activation. Troponin 150->144. Admitted to cardiology team for further avila of CM.  Given ASA in ED for which patient had itching and hives resolved with Benadryl. Started amlodipine 5 Mg and valsartan 40 Mg daily.    TTE 5/27 pending. :: ASCVD Moderate intensity statin recommended (, HDL 40, trig 149, A1C 5.5%), discussed with patient but deferred to outpatient. Cardiac MR 5/27: LVEF 48%, no WMA, diffuse LV hypertrophy, more in the septum, no signs of ischemia. Discharged to home with cardiology follow up, pt.  Without any acute complaints and feeling well.      #LVH  #HTN  :: LVH Noted on cardiac MR, LVEF 48%  :: Likely iso uncontrolled HTN  - Started Amlo 5mg and valsartan 40mg daily, cardiology referred  - Discuss statin     Pertinent Physical Exam At Time of Discharge  Constitutional: Well-developed male in no acute distress.  Muscular build.  HEENT: Normocephalic, atraumatic. PERRL. EOMI.  Respiratory: CTA bilaterally. No wheezes, rales, or rhonchi. Normal respiratory effort.  Cardiovascular: RRR. No murmurs, gallops, or rubs. No JVD.  Abdominal: Soft, nondistended, nontender to palpation.  Neuro: CN II-XII intact. UE and LE strength 5/5 bilaterally and sensation intact.  Skin: Warm, dry. No rashes or wounds.    Outpatient Follow-Up  No future appointments.  Cardiology and PCP to be scheduled    Rob Horowitz MD

## 2025-05-30 LAB
ATRIAL RATE: 75 BPM
P AXIS: 41 DEGREES
P OFFSET: 173 MS
P ONSET: 124 MS
PR INTERVAL: 170 MS
Q ONSET: 209 MS
QRS COUNT: 13 BEATS
QRS DURATION: 120 MS
QT INTERVAL: 388 MS
QTC CALCULATION(BAZETT): 433 MS
QTC FREDERICIA: 417 MS
R AXIS: -45 DEGREES
T AXIS: -16 DEGREES
T OFFSET: 403 MS
VENTRICULAR RATE: 75 BPM